# Patient Record
Sex: FEMALE | Race: WHITE
[De-identification: names, ages, dates, MRNs, and addresses within clinical notes are randomized per-mention and may not be internally consistent; named-entity substitution may affect disease eponyms.]

---

## 2018-03-07 ENCOUNTER — HOSPITAL ENCOUNTER (OUTPATIENT)
Dept: HOSPITAL 89 - LAB | Age: 38
End: 2018-03-07
Attending: INTERNAL MEDICINE
Payer: COMMERCIAL

## 2018-03-07 DIAGNOSIS — E87.6: Primary | ICD-10-CM

## 2018-03-07 DIAGNOSIS — E83.52: ICD-10-CM

## 2018-03-07 PROCEDURE — 36415 COLL VENOUS BLD VENIPUNCTURE: CPT

## 2018-03-07 PROCEDURE — 82330 ASSAY OF CALCIUM: CPT

## 2018-03-08 ENCOUNTER — HOSPITAL ENCOUNTER (OUTPATIENT)
Dept: HOSPITAL 89 - LAB | Age: 38
End: 2018-03-08
Attending: INTERNAL MEDICINE
Payer: COMMERCIAL

## 2018-03-08 DIAGNOSIS — E27.40: Primary | ICD-10-CM

## 2018-03-08 PROCEDURE — 82533 TOTAL CORTISOL: CPT

## 2018-03-08 PROCEDURE — 36415 COLL VENOUS BLD VENIPUNCTURE: CPT

## 2018-03-09 ENCOUNTER — HOSPITAL ENCOUNTER (OUTPATIENT)
Dept: HOSPITAL 89 - LAB | Age: 38
End: 2018-03-09
Attending: INTERNAL MEDICINE
Payer: COMMERCIAL

## 2018-03-09 DIAGNOSIS — E87.6: Primary | ICD-10-CM

## 2018-03-09 PROCEDURE — 82088 ASSAY OF ALDOSTERONE: CPT

## 2018-03-09 PROCEDURE — 84244 ASSAY OF RENIN: CPT

## 2018-03-09 PROCEDURE — 36415 COLL VENOUS BLD VENIPUNCTURE: CPT

## 2018-05-03 ENCOUNTER — HOSPITAL ENCOUNTER (OUTPATIENT)
Dept: HOSPITAL 89 - LAB | Age: 38
End: 2018-05-03
Attending: INTERNAL MEDICINE
Payer: COMMERCIAL

## 2018-05-03 DIAGNOSIS — E03.9: Primary | ICD-10-CM

## 2018-05-03 PROCEDURE — 84443 ASSAY THYROID STIM HORMONE: CPT

## 2018-05-03 PROCEDURE — 36415 COLL VENOUS BLD VENIPUNCTURE: CPT

## 2018-07-24 ENCOUNTER — HOSPITAL ENCOUNTER (OUTPATIENT)
Dept: HOSPITAL 89 - LAB | Age: 38
End: 2018-07-24
Attending: INTERNAL MEDICINE
Payer: COMMERCIAL

## 2018-07-24 DIAGNOSIS — E03.9: ICD-10-CM

## 2018-07-24 DIAGNOSIS — E83.52: Primary | ICD-10-CM

## 2018-07-24 PROCEDURE — 84300 ASSAY OF URINE SODIUM: CPT

## 2018-07-24 PROCEDURE — 83970 ASSAY OF PARATHORMONE: CPT

## 2018-07-24 PROCEDURE — 84520 ASSAY OF UREA NITROGEN: CPT

## 2018-07-24 PROCEDURE — 84295 ASSAY OF SERUM SODIUM: CPT

## 2018-07-24 PROCEDURE — 82565 ASSAY OF CREATININE: CPT

## 2018-07-24 PROCEDURE — 82435 ASSAY OF BLOOD CHLORIDE: CPT

## 2018-07-24 PROCEDURE — 84132 ASSAY OF SERUM POTASSIUM: CPT

## 2018-07-24 PROCEDURE — 36415 COLL VENOUS BLD VENIPUNCTURE: CPT

## 2018-07-24 PROCEDURE — 84443 ASSAY THYROID STIM HORMONE: CPT

## 2018-07-24 PROCEDURE — 82374 ASSAY BLOOD CARBON DIOXIDE: CPT

## 2018-07-24 PROCEDURE — 82306 VITAMIN D 25 HYDROXY: CPT

## 2018-07-24 PROCEDURE — 82947 ASSAY GLUCOSE BLOOD QUANT: CPT

## 2018-07-24 PROCEDURE — 82330 ASSAY OF CALCIUM: CPT

## 2018-07-24 PROCEDURE — 82310 ASSAY OF CALCIUM: CPT

## 2018-09-26 ENCOUNTER — HOSPITAL ENCOUNTER (OUTPATIENT)
Dept: HOSPITAL 89 - US | Age: 38
LOS: 2 days | Discharge: HOME | End: 2018-09-28
Attending: PHYSICIAN ASSISTANT
Payer: COMMERCIAL

## 2018-09-26 DIAGNOSIS — R60.9: ICD-10-CM

## 2018-09-26 DIAGNOSIS — R92.8: Primary | ICD-10-CM

## 2018-09-26 PROCEDURE — 77067 SCR MAMMO BI INCL CAD: CPT

## 2018-09-26 PROCEDURE — 93922 UPR/L XTREMITY ART 2 LEVELS: CPT

## 2018-09-26 PROCEDURE — 77063 BREAST TOMOSYNTHESIS BI: CPT

## 2018-09-26 NOTE — RADIOLOGY IMAGING REPORT
FACILITY: [facility] [place] 

 

PATIENT NAME: Jewell Fritz

: 1980

MR: [id number]

V: [id number]

EXAM DATE: 

ORDERING PHYSICIAN: KIM JOSHUA

TECHNOLOGIST: 

 

Location: [facility]

Patient: Jewell Fritz

: 1980

MRN: [id number]

Visit/Account:[id number]

Date of Sevice:  2018

 

ACCESSION #: [id number]

 

Exam type: ANKLE BRACHIAL INDICES

 

History: Lower leg edema

 

Comparison: None.

 

Findings:

 

The YOSI equipment was not functioning therefore the systolic velocities were obtained manually.

 

Triphasic flow is identified the right brachial artery with a peak systolic velocity of 124 cm/s.  Tr
iphasic flow is identified in the right posterior tibial artery with a peak stalk velocity 140 cm/s. 
 The right dorsalis pedis artery was not imaged.  The YOSI on the right measures 1.13

 

The peak systolic velocity in the left brachial artery is 130 cm per second with triphasic flow.  The
 peak systolic velocity in the left posterior tibial artery is 140 centers per second triphasic flow.
  The left results pedis artery was visualized with flow although systolic velocity was not obtained 
the YOSI on the left measures 1.07

 

IMPRESSION:

 

1.  YOSI on the right 1.13 and on the left 1.07

 

Report Dictated By: Jennifer Ramirez MD at 2018 5:07 PM

 

Report E-Signed By: Jennifer Ramirez MD  at 2018 5:12 PM

 

WSN:[id number]

## 2018-09-28 NOTE — RADIOLOGY IMAGING REPORT
FACILITY: [facility] 

 

PATIENT NAME: GAYATHRI KENNEDY

: [id number]

MR: [id number]

V: [id number]

EXAM DATE: 

ORDERING PHYSICIAN: KIM JOSHUA

TECHNOLOGIST: Michelle De Santiago

 

PROCEDURE:BILATERAL DIGITAL SCREENING MAMMOGRAM WITH CAD ASSISTED 

INTERPRETATION & 3D TOMOSYNTHESIS 

 

COMPARISON:None.

 

INDICATIONS:SCREENING

 

FINDINGS:  

Moderately dense fibroglandular tissue is seen throughout the breasts. 

There is a focal area of increased density in the lateral portion of 

the Left breast on the Left CC view for which Spot compression view is 

recommended. There are several focal areas of increased density in the 

upper portion of the Left breast on the Left MLO view for which Spot 

compression view is recommended.

 

DIAGNOSTIC CATEGORY 0--INCOMPLETE: NEED ADDITIONAL IMAGING EVALUATION.  

 

 

RECOMMENDATIONS:

ADDITIONAL MAMMOGRAPHIC VIEWS REQUIRED: LEFT BREAST.    

 

IMPRESSION:

BIRADS 0: Incomplete. 

Additional view of the Left breast recommended as described.

 

 

 

 

 

 

 

 

Dictated by:  Jennifer Ramirez M.D. on 2018 at 11:38   

Transcribed by: FIX on 2018 at 11:51    

Approved by:  Jennifer Ramirez M.D. on 2018 at 15:57   

[facility]

## 2018-10-23 ENCOUNTER — HOSPITAL ENCOUNTER (OUTPATIENT)
Dept: HOSPITAL 89 - MAMO | Age: 38
End: 2018-10-23
Attending: FAMILY MEDICINE
Payer: COMMERCIAL

## 2018-10-23 DIAGNOSIS — R92.2: Primary | ICD-10-CM

## 2018-10-23 PROCEDURE — 77061 BREAST TOMOSYNTHESIS UNI: CPT

## 2018-10-23 PROCEDURE — 77065 DX MAMMO INCL CAD UNI: CPT

## 2018-10-24 NOTE — RADIOLOGY IMAGING REPORT
FACILITY: [facility] [place] 

 

PATIENT NAME: GAYATHRI KENNEDY

: [id number]

MR: [id number]

V: [id number]

EXAM DATE: [id number]

ORDERING PHYSICIAN: DISHA HERNANDEZ

TECHNOLOGIST: Michelle De Santiago

 

PROCEDURE:LEFT DIGITAL DIAGNOSTIC MAMMOGRAM WITH CAD ASSISTED 

INTERPRETATION & 3D TOMOSYNTHESIS 

 

COMPARISON:Prior baseline mammogram 18.

 

INDICATIONS:FURTHER EVAL

 

FINDINGS:  

The patient returned for a Left XCC view a rolled Left CC view and Spot 

compression views in the Left CC & MLO projections.

The focal areas of increased density in the upper outer quadrant of the 

Left breast appeared partially compressible. No sonographic correlate 

was identified on Today's Left breast Ultrasound. A 6 month follow-up 

diagnostic Left mammogram is recommended to document stability of the 

parenchymal pattern sense there are no mammograms earlier than 18 

available for comparison unless clinical findings warrant more 

immediate attention.

 

DIAGNOSTIC CATEGORY 3--PROBABLY BENIGN FINDING.  

 

RECOMMENDATIONS:

SIX MONTH FOLLOW-UP DIAGNOSTIC MAMMOGRAM: LEFT BREAST.    

 

IMPRESSION:

BIRADS 3: Probably benign finding. 

A 6 month follow-up Left mammogram is recommended.

 

 

 

 

 

 

 

 

Dictated by:  Jennifer Ramirez M.D. on 10/23/2018 at 16:13   

Transcribed by: FIX on 10/24/2018 at 8:07    

Approved by:  Jennifer Ramirez M.D. on 10/24/2018 at 8:17   

[facility]

## 2018-10-24 NOTE — RADIOLOGY IMAGING REPORT
FACILITY: [facility] [place] 

 

PATIENT NAME: GAYATHRI KENNEDY

: [id number]

MR: [id number]

V: [id number]

EXAM DATE: [id number]

ORDERING PHYSICIAN: DISHA HERNANDEZ

TECHNOLOGIST: Delfin Christianson RDMS, JENNIFER

 

PROCEDURE:US LEFT BREAST

 

COMPARISON:None.

 

INDICATIONS:FURTHER EVAL FOR ASYMMETRY IN THE UPPER OUTER QUADRANT OF 

THE LEFT BREAST.

 

FINDINGS:  

The Left breast was imaged from the 12-3 o'clock position revealing no 

sonographic abnormality to account for the area of increased density in 

the upper outer quadrant of the Left breast. A 6 month follow-up Left 

mammogram is recommended to document stability of the Left breast 

parenchymal pattern unless clinical findings warrant more immediate 

attention.

 

DIAGNOSTIC CATEGORY 3--PROBABLY BENIGN FINDING.  

 

RECOMMENDATIONS:

SIX MONTH FOLLOW-UP DIAGNOSTIC MAMMOGRAM: LEFT BREAST.    

 

 

IMPRESSION:

BIRADS 3: Probably benign finding. 

A 6 month follow-up Left mammogram is recommended as described above 

please see Today's diagnostic Left mammogram report.

 

 

Dictated by:  Jennifer Ramirez M.D. on 10/23/2018 at 16:10   

Transcribed by: FIX on 10/24/2018 at 8:12    

Approved by:  Jennifer Ramirez M.D. on 10/24/2018 at 8:17   

[facility]

## 2018-11-09 ENCOUNTER — HOSPITAL ENCOUNTER (OUTPATIENT)
Dept: HOSPITAL 89 - LAB | Age: 38
End: 2018-11-09
Attending: INTERNAL MEDICINE
Payer: COMMERCIAL

## 2018-11-09 DIAGNOSIS — E83.52: ICD-10-CM

## 2018-11-09 DIAGNOSIS — E27.40: Primary | ICD-10-CM

## 2018-11-09 PROCEDURE — 36415 COLL VENOUS BLD VENIPUNCTURE: CPT

## 2018-11-09 PROCEDURE — 82374 ASSAY BLOOD CARBON DIOXIDE: CPT

## 2018-11-09 PROCEDURE — 82565 ASSAY OF CREATININE: CPT

## 2018-11-09 PROCEDURE — 84520 ASSAY OF UREA NITROGEN: CPT

## 2018-11-09 PROCEDURE — 84132 ASSAY OF SERUM POTASSIUM: CPT

## 2018-11-09 PROCEDURE — 84443 ASSAY THYROID STIM HORMONE: CPT

## 2018-11-09 PROCEDURE — 82435 ASSAY OF BLOOD CHLORIDE: CPT

## 2018-11-09 PROCEDURE — 82947 ASSAY GLUCOSE BLOOD QUANT: CPT

## 2018-11-09 PROCEDURE — 84295 ASSAY OF SERUM SODIUM: CPT

## 2018-11-09 PROCEDURE — 82310 ASSAY OF CALCIUM: CPT

## 2019-03-27 ENCOUNTER — HOSPITAL ENCOUNTER (OUTPATIENT)
Dept: HOSPITAL 89 - MAMO | Age: 39
End: 2019-03-27
Attending: FAMILY MEDICINE
Payer: COMMERCIAL

## 2019-03-27 DIAGNOSIS — R92.2: Primary | ICD-10-CM

## 2019-03-27 PROCEDURE — 77061 BREAST TOMOSYNTHESIS UNI: CPT

## 2019-03-27 PROCEDURE — 77065 DX MAMMO INCL CAD UNI: CPT

## 2019-03-29 NOTE — RADIOLOGY IMAGING REPORT
FACILITY: [facility] [place] 

 

PATIENT NAME: GAYATHRI KENNEDY

: [id number]

MR: [id number]

V: [id number]

EXAM DATE: [id number]

ORDERING PHYSICIAN: DISHA HERNANDEZ

TECHNOLOGIST: Bonita Armando

 

PROCEDURE:LEFT DIGITAL DIAGNOSTIC MAMMOGRAM  WITH CAD ASSISTED 

INTERPRETATION & 3D TOMOSYNTHESIS 

 

COMPARISON:Prior mammogram 2018, 10/23/2018.

 

INDICATIONS:six month follow up

 

FINDINGS:  

The patient returned for Left CC & MLO views as well as Spot 

compression views in the Left lateral aspect of the breast with CC Spot 

compression.

The focal density in the upper outer aspect of the Left breast is 

unchanged in its appearance suggesting normal glandular tissue. No 

additional mass lesions, clusters of suspicious calcifications or 

architectural distortions are identified.

 

DIAGNOSTIC CATEGORY 2--BENIGN FINDING.  

 

RECOMMENDATIONS:

SIX MONTH FOLLOW-UP MAMMOGRAM: BILATERAL BREASTS.    

 

IMPRESSION:

BIRADS 2: Benign finding. 

 

 

 

 

 

Dictated by:  Fitz Oneal M.D. on 3/27/2019 at 16:20   

Transcribed by: FIX on 3/28/2019 at 10:22    

Approved by:  Gareth Waters on 3/29/2019 at 10:53   

[facility]